# Patient Record
Sex: FEMALE | Race: BLACK OR AFRICAN AMERICAN | NOT HISPANIC OR LATINO | Employment: FULL TIME | URBAN - METROPOLITAN AREA
[De-identification: names, ages, dates, MRNs, and addresses within clinical notes are randomized per-mention and may not be internally consistent; named-entity substitution may affect disease eponyms.]

---

## 2024-06-04 ENCOUNTER — OFFICE VISIT (OUTPATIENT)
Dept: FAMILY MEDICINE CLINIC | Facility: CLINIC | Age: 24
End: 2024-06-04
Payer: COMMERCIAL

## 2024-06-04 VITALS
SYSTOLIC BLOOD PRESSURE: 142 MMHG | BODY MASS INDEX: 31.65 KG/M2 | OXYGEN SATURATION: 95 % | HEART RATE: 105 BPM | RESPIRATION RATE: 16 BRPM | HEIGHT: 65 IN | WEIGHT: 190 LBS | DIASTOLIC BLOOD PRESSURE: 80 MMHG

## 2024-06-04 DIAGNOSIS — Z11.59 NEED FOR HEPATITIS C SCREENING TEST: ICD-10-CM

## 2024-06-04 DIAGNOSIS — Z12.4 CERVICAL CANCER SCREENING: ICD-10-CM

## 2024-06-04 DIAGNOSIS — Z13.220 SCREENING FOR HYPERLIPIDEMIA: ICD-10-CM

## 2024-06-04 DIAGNOSIS — Z13.228 ENCOUNTER FOR SCREENING FOR OTHER METABOLIC DISORDERS: ICD-10-CM

## 2024-06-04 DIAGNOSIS — Z00.00 ANNUAL PHYSICAL EXAM: Primary | ICD-10-CM

## 2024-06-04 DIAGNOSIS — N92.6 IRREGULAR PERIODS: ICD-10-CM

## 2024-06-04 DIAGNOSIS — N94.10 PAIN IN FEMALE GENITALIA ON INTERCOURSE: ICD-10-CM

## 2024-06-04 PROBLEM — E66.9 OBESITY (BMI 30-39.9): Status: ACTIVE | Noted: 2024-06-04

## 2024-06-04 PROCEDURE — 99385 PREV VISIT NEW AGE 18-39: CPT | Performed by: NURSE PRACTITIONER

## 2024-06-04 RX ORDER — FLUTICASONE PROPIONATE 50 MCG
SPRAY, SUSPENSION (ML) NASAL EVERY 24 HOURS
COMMUNITY

## 2024-06-04 RX ORDER — MELOXICAM 15 MG/1
15 TABLET ORAL DAILY
COMMUNITY
Start: 2024-04-03

## 2024-06-04 RX ORDER — CEFDINIR 300 MG/1
CAPSULE ORAL
COMMUNITY
Start: 2024-05-24

## 2024-06-04 RX ORDER — CYCLOBENZAPRINE HCL 10 MG
10 TABLET ORAL
COMMUNITY
Start: 2024-04-03

## 2024-06-04 RX ORDER — CETIRIZINE HYDROCHLORIDE 10 MG/1
1 TABLET ORAL DAILY
COMMUNITY

## 2024-06-04 NOTE — PROGRESS NOTES
Preventive Exam    History of Present Illness: Dede Howard is a 24 y.o. here for check up and review of routine health maintenance. she states she is doing well and has no concerns.    She is new to me and new to the office. She has irregular periods, reports that this has been this way since high school. Was placed on birth control and this made her periods worse.  She reports pain with intercourse and low sex drive. She admits to facial hair growth.     Past medical history, surgical history and family history have been reviewed.     Review of Systems   Constitutional:  Negative for appetite change, chills, fatigue and fever.   HENT:  Negative for congestion, dental problem, mouth sores, sinus pressure and sore throat.         Dental exam is up to date.    Eyes: Negative.  Negative for blurred vision, double vision, photophobia and visual disturbance.        Wears glasses. Eye exam is up to date.    Respiratory:  Negative for cough, chest tightness, shortness of breath and wheezing.    Cardiovascular:  Negative for chest pain, palpitations and leg swelling.   Gastrointestinal:  Negative for abdominal pain, constipation, diarrhea, nausea and vomiting.   Endocrine: Negative.  Negative for cold intolerance and heat intolerance.   Genitourinary: Negative.  Positive for menstrual problem. Negative for breast discharge, breast lump, breast pain, difficulty urinating, dyspareunia, frequency, pelvic pain, pelvic pressure, urgency, vaginal bleeding and vaginal discharge.   Musculoskeletal:  Negative for arthralgias, back pain, joint swelling and myalgias.   Skin: Negative.    Allergic/Immunologic: Negative.  Positive for environmental allergies (Seasonal). Negative for food allergies.   Neurological:  Negative for dizziness, weakness, numbness and headache.   Hematological: Negative.  Does not bruise/bleed easily.   Psychiatric/Behavioral: Negative.  Negative for sleep disturbance, suicidal ideas, negative for  hyperactivity and depressed mood. The patient is nervous/anxious.        PHYSICAL EXAM    Vitals:    06/04/24 1330   BP: 142/80   Pulse: 105   Resp: 16   SpO2: 95%         Body mass index is 31.62 kg/m².   BMI is >= 30 and <35. (Class 1 Obesity). The following options were offered after discussion;: exercise counseling/recommendations and nutrition counseling/recommendations       Physical Exam  Vitals and nursing note reviewed.   Constitutional:       Appearance: She is well-developed.   HENT:      Head: Normocephalic and atraumatic.      Right Ear: Tympanic membrane, ear canal and external ear normal.      Left Ear: Tympanic membrane, ear canal and external ear normal.      Nose: Nose normal.      Mouth/Throat:      Lips: Pink.      Mouth: Mucous membranes are moist.      Tongue: No lesions.      Palate: No mass and lesions.      Pharynx: Oropharynx is clear. Uvula midline.      Tonsils: No tonsillar exudate.   Eyes:      Conjunctiva/sclera: Conjunctivae normal.      Pupils: Pupils are equal, round, and reactive to light.   Neck:      Thyroid: No thyromegaly.   Cardiovascular:      Rate and Rhythm: Normal rate and regular rhythm.      Pulses: Normal pulses.           Dorsalis pedis pulses are 2+ on the right side and 2+ on the left side.        Posterior tibial pulses are 2+ on the right side and 2+ on the left side.      Heart sounds: Normal heart sounds. No murmur heard.  Pulmonary:      Effort: Pulmonary effort is normal.      Breath sounds: Normal breath sounds.   Abdominal:      General: Bowel sounds are normal. There is no distension.      Palpations: Abdomen is soft.      Tenderness: There is no abdominal tenderness.   Musculoskeletal:         General: No deformity. Normal range of motion.      Cervical back: Normal range of motion and neck supple.      Right lower leg: No edema.      Left lower leg: No edema.   Lymphadenopathy:      Head:      Right side of head: No submental, submandibular, tonsillar,  preauricular, posterior auricular or occipital adenopathy.      Left side of head: No submental, submandibular, tonsillar, preauricular, posterior auricular or occipital adenopathy.      Cervical: No cervical adenopathy.      Right cervical: No superficial, deep or posterior cervical adenopathy.     Left cervical: No superficial, deep or posterior cervical adenopathy.      Upper Body:      Right upper body: No supraclavicular adenopathy.      Left upper body: No supraclavicular adenopathy.   Skin:     General: Skin is warm and dry.      Capillary Refill: Capillary refill takes 2 to 3 seconds.   Neurological:      General: No focal deficit present.      Mental Status: She is alert and oriented to person, place, and time.      Cranial Nerves: No cranial nerve deficit.      Sensory: Sensation is intact.      Motor: Motor function is intact.      Coordination: Coordination is intact.      Gait: Gait is intact.   Psychiatric:         Attention and Perception: Attention and perception normal.         Mood and Affect: Mood and affect normal.         Speech: Speech normal.         Behavior: Behavior normal. Behavior is cooperative.         Thought Content: Thought content normal.         Cognition and Memory: Cognition and memory normal.         Judgment: Judgment normal.         Procedures    Diagnoses and all orders for this visit:    1. Annual physical exam (Primary)    2. Pain in female genitalia on intercourse  -     Ambulatory Referral to Obstetrics / Gynecology    3. Irregular periods  -     Ambulatory Referral to Obstetrics / Gynecology    4. Cervical cancer screening  -     Ambulatory Referral to Obstetrics / Gynecology    5. Encounter for screening for other metabolic disorders  -     CBC & Differential  -     Comprehensive Metabolic Panel  -     TSH  -     T3, Free  -     T4, Free    6. Need for hepatitis C screening test  -     Hepatitis C Antibody    7. Screening for hyperlipidemia  -     Lipid Panel With /  Chol / HDL Ratio        Problems Addressed this Visit    None  Visit Diagnoses       Annual physical exam    -  Primary    Pain in female genitalia on intercourse        Relevant Orders    Ambulatory Referral to Obstetrics / Gynecology    Irregular periods        Relevant Orders    Ambulatory Referral to Obstetrics / Gynecology    Cervical cancer screening        Relevant Orders    Ambulatory Referral to Obstetrics / Gynecology    Encounter for screening for other metabolic disorders        Relevant Orders    CBC & Differential    Comprehensive Metabolic Panel    TSH    T3, Free    T4, Free    Need for hepatitis C screening test        Relevant Orders    Hepatitis C Antibody    Screening for hyperlipidemia        Relevant Orders    Lipid Panel With / Chol / HDL Ratio          Diagnoses         Codes Comments    Annual physical exam    -  Primary ICD-10-CM: Z00.00  ICD-9-CM: V70.0     Pain in female genitalia on intercourse     ICD-10-CM: N94.10  ICD-9-CM: 625.0     Irregular periods     ICD-10-CM: N92.6  ICD-9-CM: 626.4     Cervical cancer screening     ICD-10-CM: Z12.4  ICD-9-CM: V76.2     Encounter for screening for other metabolic disorders     ICD-10-CM: Z13.228  ICD-9-CM: V77.99     Need for hepatitis C screening test     ICD-10-CM: Z11.59  ICD-9-CM: V73.89     Screening for hyperlipidemia     ICD-10-CM: Z13.220  ICD-9-CM: V77.91           Referral to OB/GYN - suspect PCOS; vaginismus  CMP  Lipid panel  CBC  Hepatitis C Screening  Thyroid panel    Routine health maintenance reviewed and discussed with Dede Howard.    Preventative counseling regarding healthy diet and exercise.   Pt reports that he wears a seatbelt regularly.     Return in about 1 year (around 6/4/2025) for Annual, Labs.

## 2024-06-05 LAB
ALBUMIN SERPL-MCNC: 4.5 G/DL (ref 3.5–5.2)
ALBUMIN/GLOB SERPL: 1.9 G/DL
ALP SERPL-CCNC: 62 U/L (ref 39–117)
ALT SERPL-CCNC: 8 U/L (ref 1–33)
AST SERPL-CCNC: 11 U/L (ref 1–32)
BASOPHILS # BLD AUTO: 0.06 10*3/MM3 (ref 0–0.2)
BASOPHILS NFR BLD AUTO: 1.3 % (ref 0–1.5)
BILIRUB SERPL-MCNC: 0.4 MG/DL (ref 0–1.2)
BUN SERPL-MCNC: 11 MG/DL (ref 6–20)
BUN/CREAT SERPL: 13.6 (ref 7–25)
CALCIUM SERPL-MCNC: 9.7 MG/DL (ref 8.6–10.5)
CHLORIDE SERPL-SCNC: 101 MMOL/L (ref 98–107)
CHOLEST SERPL-MCNC: 201 MG/DL (ref 0–200)
CHOLEST/HDLC SERPL: 3.65 {RATIO}
CO2 SERPL-SCNC: 24.6 MMOL/L (ref 22–29)
CREAT SERPL-MCNC: 0.81 MG/DL (ref 0.57–1)
EGFRCR SERPLBLD CKD-EPI 2021: 103.5 ML/MIN/1.73
EOSINOPHIL # BLD AUTO: 0.12 10*3/MM3 (ref 0–0.4)
EOSINOPHIL NFR BLD AUTO: 2.5 % (ref 0.3–6.2)
ERYTHROCYTE [DISTWIDTH] IN BLOOD BY AUTOMATED COUNT: 12.1 % (ref 12.3–15.4)
GLOBULIN SER CALC-MCNC: 2.4 GM/DL
GLUCOSE SERPL-MCNC: 97 MG/DL (ref 65–99)
HCT VFR BLD AUTO: 42.6 % (ref 34–46.6)
HCV IGG SERPL QL IA: NON REACTIVE
HDLC SERPL-MCNC: 55 MG/DL (ref 40–60)
HGB BLD-MCNC: 14.2 G/DL (ref 12–15.9)
IMM GRANULOCYTES # BLD AUTO: 0.01 10*3/MM3 (ref 0–0.05)
IMM GRANULOCYTES NFR BLD AUTO: 0.2 % (ref 0–0.5)
LDLC SERPL CALC-MCNC: 135 MG/DL (ref 0–100)
LYMPHOCYTES # BLD AUTO: 1.16 10*3/MM3 (ref 0.7–3.1)
LYMPHOCYTES NFR BLD AUTO: 24.5 % (ref 19.6–45.3)
MCH RBC QN AUTO: 29.3 PG (ref 26.6–33)
MCHC RBC AUTO-ENTMCNC: 33.3 G/DL (ref 31.5–35.7)
MCV RBC AUTO: 88 FL (ref 79–97)
MONOCYTES # BLD AUTO: 0.4 10*3/MM3 (ref 0.1–0.9)
MONOCYTES NFR BLD AUTO: 8.4 % (ref 5–12)
NEUTROPHILS # BLD AUTO: 2.99 10*3/MM3 (ref 1.7–7)
NEUTROPHILS NFR BLD AUTO: 63.1 % (ref 42.7–76)
NRBC BLD AUTO-RTO: 0 /100 WBC (ref 0–0.2)
PLATELET # BLD AUTO: 292 10*3/MM3 (ref 140–450)
POTASSIUM SERPL-SCNC: 3.7 MMOL/L (ref 3.5–5.2)
PROT SERPL-MCNC: 6.9 G/DL (ref 6–8.5)
RBC # BLD AUTO: 4.84 10*6/MM3 (ref 3.77–5.28)
SODIUM SERPL-SCNC: 137 MMOL/L (ref 136–145)
T3FREE SERPL-MCNC: 3.3 PG/ML (ref 2–4.4)
T4 FREE SERPL-MCNC: 1.21 NG/DL (ref 0.92–1.68)
TRIGL SERPL-MCNC: 61 MG/DL (ref 0–150)
TSH SERPL DL<=0.005 MIU/L-ACNC: 1.93 UIU/ML (ref 0.27–4.2)
VLDLC SERPL CALC-MCNC: 11 MG/DL (ref 5–40)
WBC # BLD AUTO: 4.74 10*3/MM3 (ref 3.4–10.8)

## 2024-06-07 ENCOUNTER — PATIENT ROUNDING (BHMG ONLY) (OUTPATIENT)
Dept: FAMILY MEDICINE CLINIC | Facility: CLINIC | Age: 24
End: 2024-06-07
Payer: COMMERCIAL

## 2024-06-07 NOTE — PROGRESS NOTES
A [facility] message has been sent to the patient for PATIENT ROUNDING with [facility] [place].

## 2024-06-20 ENCOUNTER — OFFICE VISIT (OUTPATIENT)
Dept: OBSTETRICS AND GYNECOLOGY | Age: 24
End: 2024-06-20
Payer: COMMERCIAL

## 2024-06-20 VITALS
SYSTOLIC BLOOD PRESSURE: 120 MMHG | DIASTOLIC BLOOD PRESSURE: 80 MMHG | WEIGHT: 190 LBS | HEIGHT: 65 IN | BODY MASS INDEX: 31.65 KG/M2

## 2024-06-20 DIAGNOSIS — E28.2 PCOS (POLYCYSTIC OVARIAN SYNDROME): Primary | ICD-10-CM

## 2024-06-20 DIAGNOSIS — N94.10 FEMALE DYSPAREUNIA: ICD-10-CM

## 2024-06-20 DIAGNOSIS — Z12.4 SCREENING FOR CERVICAL CANCER: ICD-10-CM

## 2024-06-20 DIAGNOSIS — L28.0 LICHEN SIMPLEX CHRONICUS: ICD-10-CM

## 2024-06-20 RX ORDER — NORGESTIMATE AND ETHINYL ESTRADIOL 0.25-0.035
1 KIT ORAL DAILY
Qty: 84 TABLET | Refills: 4 | Status: SHIPPED | OUTPATIENT
Start: 2024-06-20

## 2024-06-20 RX ORDER — CLOBETASOL PROPIONATE 0.5 MG/G
1 OINTMENT TOPICAL NIGHTLY
Qty: 45 G | Refills: 3 | Status: SHIPPED | OUTPATIENT
Start: 2024-06-20

## 2024-06-20 NOTE — PROGRESS NOTES
[facility]   Obstetrics and Gynecology   New Gynecology Visit    2024    Patient: Dede Howard          MR#:[id number]    History of Present Illness    Chief Complaint   Patient presents with    Gynecologic Exam     New gyn: pt complains of irregular periods, pain with intercourse,dry and itchy skin       24 y.o. female  who presents for irregular periods and pain with intercourse. She states that she sometimes skips periods and sometimes her periods last weeks at a time. She does have more than 4 periods a year.   She also has had pain with intercourse both initial penetration and deep penetration since she became sexually active and this is greatly affecting her libido.   In addition, she states that her vulvar skin has always been very dry and itchy despite trying various soaps and moisturizers.           Obstetric History:  OB History          0    Para   0    Term   0       0    AB   0    Living   0         SAB   0    IAB   0    Ectopic   0    Molar   0    Multiple   0    Live Births   0               Menstrual History:     Patient's last menstrual period was 2024 (exact date).                ________________________________________  Patient Active Problem List   Diagnosis    Obesity (BMI 30-39.9)    PCOS (polycystic ovarian syndrome)    Lichen simplex chronicus     Past Medical History:   Diagnosis Date    Allergic     Ovarian cyst     Polycystic ovary syndrome     Scoliosis      History reviewed. No pertinent surgical history.  Social History     Tobacco Use   Smoking Status Never   Smokeless Tobacco Never     Family History   Problem Relation Age of Onset    Thyroid disease Mother     Hypertension Father     No Known Problems Sister     Stroke Maternal Grandmother     Heart disease Paternal Grandfather      Prior to Admission medications    Medication Sig Start Date End Date Taking? Authorizing Provider   cefdinir (OMNICEF) 300 MG capsule  24    "Jonh Naranjo MD   cetirizine (ZyrTEC Allergy) 10 MG tablet Take 1 tablet by mouth Daily.  Patient not taking: Reported on 6/4/2024    Jonh Naranjo MD   cyclobenzaprine (FLEXERIL) 10 MG tablet Take 1 tablet by mouth. 4/3/24   Jonh Naranjo MD   Dexchlorpheniramine-Phenyleph (STAHIST PO) Take  by mouth.    Jonh Naranjo MD   fluticasone (Flonase Allergy Relief) 50 MCG/ACT nasal spray Daily.    Jonh Naranjo MD   meloxicam (MOBIC) 15 MG tablet Take 1 tablet by mouth Daily. 4/3/24   Jonh Naranjo MD     ________________________________________    The following portions of the patient's history were reviewed and updated as appropriate: allergies, current medications, past family history, past medical history, past social history, past surgical history, and problem list.           Objective     /80   Ht 165 cm (64.96\")   Wt 86.2 kg (190 lb)   LMP 05/26/2024 (Exact Date) Comment: irregular  BMI 31.66 kg/m²    BP Readings from Last 3 Encounters:   06/20/24 120/80   06/04/24 142/80      Wt Readings from Last 3 Encounters:   06/20/24 86.2 kg (190 lb)   06/04/24 86.2 kg (190 lb)        BMI: Estimated body mass index is 31.66 kg/m² as calculated from the following:    Height as of this encounter: 165 cm (64.96\").    Weight as of this encounter: 86.2 kg (190 lb).    Physical Exam  Vitals and nursing note reviewed. Exam conducted with a chaperone present.   Constitutional:       Appearance: Normal appearance.   HENT:      Head: Normocephalic and atraumatic.   Pulmonary:      Effort: Pulmonary effort is normal.   Abdominal:      General: Abdomen is flat.      Palpations: Abdomen is soft.   Genitourinary:     Exam position: Lithotomy position.      Vagina: Normal. No vaginal discharge or lesions.      Cervix: Normal. No lesion.      Uterus: Normal. Not tender.       Adnexa: Right adnexa normal and left adnexa normal.        Right: No mass or tenderness.          Left: No " mass or tenderness.        Comments: Dry, scaly, thickened appearing skin likely consistent with lichen simplex chronicus   Skin:     General: Skin is warm and dry.   Neurological:      Mental Status: She is alert and oriented to person, place, and time.   Psychiatric:         Mood and Affect: Mood normal.                 Assessment:  Diagnoses and all orders for this visit:    1. PCOS (polycystic ovarian syndrome) (Primary)  Overview:  - Reviewed diagnostic criteria for PCOS.   - Patient endorses oligomenorrhea and hirsutism.   - Will have patient return for TVUS.   - Labs today.   - Patient would like to try continuous OCPs for menstrual control.       Orders:  -     Prolactin  -     TSH  -     17-Hydroxyprogesterone  -     Testosterone Free Direct  -     DHEA-Sulfate  -     Follicle Stimulating Hormone  -     Estradiol  -     Hemoglobin A1c  -     norgestimate-ethinyl estradiol (ORTHO-CYCLEN) 0.25-35 MG-MCG per tablet; Take 1 tablet by mouth Daily.  Dispense: 84 tablet; Refill: 4    2. Female dyspareunia  -     Ambulatory Referral to Physical Therapy    3. Lichen simplex chronicus  Overview:  - exam today consistent with Lichen simplex chronicus.   - reviewed lifestyle modifications.   - Begin trial of clobetasol for 6 weeks.     Orders:  -     clobetasol (TEMOVATE) 0.05 % ointment; Apply 1 Application topically to the appropriate area as directed Every Night. For 6 weeks.  Dispense: 45 g; Refill: 3    4. Screening for cervical cancer  -     IGP, Apt HPV,rfx 16 / 18,45          Plan:  Return in about 3 months (around 9/20/2024).      Maryan Hawkins MD  6/20/2024 12:01 EDT

## 2024-06-23 LAB
CYTOLOGIST CVX/VAG CYTO: NORMAL
CYTOLOGY CVX/VAG DOC CYTO: NORMAL
CYTOLOGY CVX/VAG DOC THIN PREP: NORMAL
DX ICD CODE: NORMAL
HPV I/H RISK 4 DNA CVX QL PROBE+SIG AMP: NEGATIVE
Lab: NORMAL
OTHER STN SPEC: NORMAL
STAT OF ADQ CVX/VAG CYTO-IMP: NORMAL

## 2024-06-26 LAB
17OHP SERPL-MCNC: 104 NG/DL
DHEA-S SERPL-MCNC: 245 UG/DL (ref 84.8–378)
ESTRADIOL SERPL-MCNC: 63.2 PG/ML
FSH SERPL-ACNC: 5.9 MIU/ML
HBA1C MFR BLD: 5.7 % (ref 4.8–5.6)
PROLACTIN SERPL-MCNC: 14.1 NG/ML (ref 4.8–33.4)
TESTOST FREE SERPL-MCNC: 1.6 PG/ML (ref 0–4.2)
TSH SERPL DL<=0.005 MIU/L-ACNC: 2.9 UIU/ML (ref 0.45–4.5)

## 2024-07-02 DIAGNOSIS — R73.03 PREDIABETES: ICD-10-CM

## 2024-07-02 DIAGNOSIS — E28.2 PCOS (POLYCYSTIC OVARIAN SYNDROME): Primary | ICD-10-CM

## 2024-09-19 ENCOUNTER — OFFICE VISIT (OUTPATIENT)
Dept: OBSTETRICS AND GYNECOLOGY | Age: 24
End: 2024-09-19
Payer: COMMERCIAL

## 2024-09-19 VITALS
SYSTOLIC BLOOD PRESSURE: 120 MMHG | WEIGHT: 189 LBS | HEIGHT: 65 IN | BODY MASS INDEX: 31.49 KG/M2 | DIASTOLIC BLOOD PRESSURE: 80 MMHG

## 2024-09-19 DIAGNOSIS — E28.2 PCOS (POLYCYSTIC OVARIAN SYNDROME): Primary | ICD-10-CM

## 2024-09-19 RX ORDER — SPIRONOLACTONE 50 MG/1
50 TABLET, FILM COATED ORAL DAILY
Qty: 30 TABLET | Refills: 0 | Status: SHIPPED | OUTPATIENT
Start: 2024-09-19

## 2024-09-20 LAB
ALBUMIN SERPL-MCNC: 4.4 G/DL (ref 3.5–5.2)
ALBUMIN/GLOB SERPL: 1.7 G/DL
ALP SERPL-CCNC: 66 U/L (ref 39–117)
ALT SERPL-CCNC: 11 U/L (ref 1–33)
AST SERPL-CCNC: 14 U/L (ref 1–32)
BILIRUB SERPL-MCNC: 0.3 MG/DL (ref 0–1.2)
BUN SERPL-MCNC: 15 MG/DL (ref 6–20)
BUN/CREAT SERPL: 18.3 (ref 7–25)
CALCIUM SERPL-MCNC: 9.3 MG/DL (ref 8.6–10.5)
CHLORIDE SERPL-SCNC: 101 MMOL/L (ref 98–107)
CO2 SERPL-SCNC: 27.3 MMOL/L (ref 22–29)
CREAT SERPL-MCNC: 0.82 MG/DL (ref 0.57–1)
EGFRCR SERPLBLD CKD-EPI 2021: 101.9 ML/MIN/1.73
GLOBULIN SER CALC-MCNC: 2.6 GM/DL
GLUCOSE SERPL-MCNC: 95 MG/DL (ref 65–99)
POTASSIUM SERPL-SCNC: 4 MMOL/L (ref 3.5–5.2)
PROT SERPL-MCNC: 7 G/DL (ref 6–8.5)
SODIUM SERPL-SCNC: 138 MMOL/L (ref 136–145)

## 2025-04-02 ENCOUNTER — OFFICE VISIT (OUTPATIENT)
Dept: OBSTETRICS AND GYNECOLOGY | Age: 25
End: 2025-04-02
Payer: COMMERCIAL

## 2025-04-02 VITALS
DIASTOLIC BLOOD PRESSURE: 94 MMHG | WEIGHT: 195.4 LBS | SYSTOLIC BLOOD PRESSURE: 132 MMHG | HEIGHT: 65 IN | BODY MASS INDEX: 32.55 KG/M2

## 2025-04-02 DIAGNOSIS — E28.2 PCOS (POLYCYSTIC OVARIAN SYNDROME): Primary | ICD-10-CM

## 2025-04-02 NOTE — PROGRESS NOTES
[facility]   Obstetrics and Gynecology     2025      Patient:  Dede Howard   MR#:[id number]    Office note    Chief Complaint   Patient presents with    Follow-up     CC: gyn f/u        Subjective     History of Present Illness  25 y.o. female  presenting for f/u for menstrual suppression. She has not been taking her OCPs or her metformin, but has been having relatively normal periods. She also states she still has some pelvic pain, but has not committed to performing pelvic floor exercises as prescribed by PT at home.             Patient Active Problem List   Diagnosis    Obesity (BMI 30-39.9)    PCOS (polycystic ovarian syndrome)    Lichen simplex chronicus       Past Medical History:   Diagnosis Date    Allergic     Ovarian cyst     Polycystic ovary syndrome     Scoliosis      History reviewed. No pertinent surgical history.  Obstetric History:  OB History          0    Para   0    Term   0       0    AB   0    Living   0         SAB   0    IAB   0    Ectopic   0    Molar   0    Multiple   0    Live Births   0               Menstrual History:     Patient's last menstrual period was 2025 (exact date).       The patient has never been pregnant.  Family History   Problem Relation Age of Onset    Thyroid disease Mother     Hypertension Father     No Known Problems Sister     Stroke Maternal Grandmother     Heart disease Paternal Grandfather      Social History     Tobacco Use    Smoking status: Never    Smokeless tobacco: Never   Vaping Use    Vaping status: Never Used   Substance Use Topics    Alcohol use: Never    Drug use: Never     Penicillins    Current Outpatient Medications:     cefdinir (OMNICEF) 300 MG capsule, , Disp: , Rfl:     cetirizine (ZyrTEC Allergy) 10 MG tablet, Take 1 tablet by mouth Daily. (Patient not taking: Reported on 2025), Disp: , Rfl:     clobetasol (TEMOVATE) 0.05 % ointment, Apply 1 Application topically to the appropriate area  "as directed Every Night. For 6 weeks. (Patient not taking: Reported on 4/2/2025), Disp: 45 g, Rfl: 3    cyclobenzaprine (FLEXERIL) 10 MG tablet, Take 1 tablet by mouth. (Patient not taking: Reported on 4/2/2025), Disp: , Rfl:     Dexchlorpheniramine-Phenyleph (STAHIST PO), Take  by mouth. (Patient not taking: Reported on 4/2/2025), Disp: , Rfl:     fluticasone (Flonase Allergy Relief) 50 MCG/ACT nasal spray, Daily. (Patient not taking: Reported on 4/2/2025), Disp: , Rfl:     meloxicam (MOBIC) 15 MG tablet, Take 1 tablet by mouth Daily. (Patient not taking: Reported on 4/2/2025), Disp: , Rfl:     metFORMIN (GLUCOPHAGE) 500 MG tablet, Take 1 tablet by mouth 2 (Two) Times a Day With Meals. (Patient not taking: Reported on 4/2/2025), Disp: 60 tablet, Rfl: 3    norgestimate-ethinyl estradiol (ORTHO-CYCLEN) 0.25-35 MG-MCG per tablet, Take 1 tablet by mouth Daily. (Patient not taking: Reported on 4/2/2025), Disp: 84 tablet, Rfl: 4    spironolactone (Aldactone) 50 MG tablet, Take 1 tablet by mouth Daily. (Patient not taking: Reported on 4/2/2025), Disp: 30 tablet, Rfl: 0      Review of Systems   All other systems reviewed and are negative.      BP Readings from Last 3 Encounters:   04/02/25 132/94   09/19/24 120/80   06/20/24 120/80      Wt Readings from Last 3 Encounters:   04/02/25 88.6 kg (195 lb 6.4 oz)   09/19/24 85.7 kg (189 lb)   06/20/24 86.2 kg (190 lb)      BMI: Estimated body mass index is 32.56 kg/m² as calculated from the following:    Height as of this encounter: 165 cm (64.96\").    Weight as of this encounter: 88.6 kg (195 lb 6.4 oz). BSA: Estimated body surface area is 1.96 meters squared as calculated from the following:    Height as of this encounter: 165 cm (64.96\").    Weight as of this encounter: 88.6 kg (195 lb 6.4 oz).    Objective   Physical Exam  Vitals and nursing note reviewed.   Constitutional:       Appearance: Normal appearance.   HENT:      Head: Normocephalic and atraumatic.   Pulmonary:      " Effort: Pulmonary effort is normal.   Neurological:      Mental Status: She is alert and oriented to person, place, and time.   Psychiatric:         Mood and Affect: Mood normal.         Assessment & Plan   25 y.o. female  presenting for f/u for menstrual suppression.     Diagnoses and all orders for this visit:    1. PCOS (polycystic ovarian syndrome) (Primary)  Overview:  24  - Reviewed diagnostic criteria for PCOS.   - Patient endorses oligomenorrhea and hirsutism.   - Will have patient return for TVUS.   - Labs today.   - Patient would like to try continuous OCPs for menstrual control.     24  -Has not started taking birth control pills, but states that she will start as she would like to have fewer or no periods.  We discussed continuous cycling with OCPs.  -We will also start spironolactone today CMP ordered for LFTs.  -TVUS reveals polycystic appearing ovaries but otherwise normal.    25  -Patient has never started OCPs.  -Has been having relatively regular periods.  -We discussed that we do not need to treat her periods if they are not bothering her.  She was started on metformin by her PCP but discontinued after a day due to nausea.  I did encourage her to try to continue this medication at least.  -We will continue to monitor her cycles off of OCPs for now and I will see her again this summer for her annual.          Return in about 3 months (around 2025).    Maryan Hawkins MD   2025 15:31 EDT

## 2025-05-21 ENCOUNTER — OFFICE VISIT (OUTPATIENT)
Dept: FAMILY MEDICINE CLINIC | Facility: CLINIC | Age: 25
End: 2025-05-21
Payer: COMMERCIAL

## 2025-05-21 VITALS
BODY MASS INDEX: 32.82 KG/M2 | RESPIRATION RATE: 18 BRPM | WEIGHT: 197 LBS | DIASTOLIC BLOOD PRESSURE: 98 MMHG | OXYGEN SATURATION: 100 % | HEART RATE: 106 BPM | SYSTOLIC BLOOD PRESSURE: 142 MMHG | HEIGHT: 65 IN

## 2025-05-21 DIAGNOSIS — M54.50 CHRONIC LOW BACK PAIN WITHOUT SCIATICA, UNSPECIFIED BACK PAIN LATERALITY: Primary | ICD-10-CM

## 2025-05-21 DIAGNOSIS — G89.29 CHRONIC LOW BACK PAIN WITHOUT SCIATICA, UNSPECIFIED BACK PAIN LATERALITY: Primary | ICD-10-CM

## 2025-05-21 PROCEDURE — 99213 OFFICE O/P EST LOW 20 MIN: CPT | Performed by: NURSE PRACTITIONER

## 2025-05-21 RX ORDER — NAPROXEN 500 MG/1
TABLET ORAL
COMMUNITY
Start: 2025-05-19

## 2025-05-21 RX ORDER — METHOCARBAMOL 500 MG/1
TABLET, FILM COATED ORAL
COMMUNITY
Start: 2025-05-19

## 2025-05-21 NOTE — PROGRESS NOTES
Subjective   Dede Howard is a 25 y.o. female.     Chief Complaint   Patient presents with    Back Pain        History of Present Illness     History of Present Illness  The patient presents for back pain.    She reports history of back pain, which was exacerbated on Easter Sunday when she experienced a sharp, tailbone-like pain while dressing. This incident rendered her immobile for the remainder of the day, with the pain persisting for an additional 2 to 3 days. The pain was particularly intense during movements such as bending or sitting up, and it took her approximately an hour to rise from bed on the first night. Due to the unavailability of urgent care facilities on that day, she sought medical attention at the ER where an x-ray revealed abnormalities in her disc. She was subsequently referred to a spine specialist who recommended further imaging studies including a CT scan and MRI.     She initiated physical therapy yesterday and is currently seeking to return to work. She has been off work since 05/09/2025. She has been provided with exercises by her physical therapist. She has a past history of back pain, but not of this severity.     The following portions of the patient's history were reviewed and updated as appropriate: allergies, current medications, past family history, past medical history, past social history, past surgical history and problem list.    Review of Systems   Constitutional:  Negative for chills, fatigue, fever and unexpected weight loss.   Respiratory:  Negative for cough, choking, chest tightness, shortness of breath, wheezing and stridor.    Cardiovascular:  Negative for chest pain, palpitations and leg swelling.   Gastrointestinal:  Negative for abdominal pain.   Genitourinary:  Negative for dysuria, pelvic pain, urinary incontinence, vaginal bleeding and vaginal discharge.   Musculoskeletal:  Positive for back pain.   Neurological:  Negative for dizziness, weakness, numbness and  headache.   Hematological: Negative.    Psychiatric/Behavioral: Negative.  Negative for sleep disturbance.        Objective   Physical Exam  Vitals and nursing note reviewed.   Constitutional:       Appearance: Normal appearance. She is well-developed.   HENT:      Head: Normocephalic and atraumatic.   Eyes:      Conjunctiva/sclera: Conjunctivae normal.      Pupils: Pupils are equal, round, and reactive to light.   Cardiovascular:      Rate and Rhythm: Normal rate and regular rhythm.      Heart sounds: Normal heart sounds. No murmur heard.  Pulmonary:      Effort: Pulmonary effort is normal.      Breath sounds: Normal breath sounds.   Neurological:      Mental Status: She is alert and oriented to person, place, and time.   Psychiatric:         Behavior: Behavior normal.         Thought Content: Thought content normal.         Judgment: Judgment normal.         Vitals:    05/21/25 1006   BP: 142/98   Pulse: 106   Resp: 18   SpO2: 100%     Body mass index is 32.82 kg/m².      Procedures    Assessment & Plan   Problems Addressed this Visit    None  Visit Diagnoses         Chronic low back pain without sciatica, unspecified back pain laterality    -  Primary          Diagnoses         Codes Comments      Chronic low back pain without sciatica, unspecified back pain laterality    -  Primary ICD-10-CM: M54.50, G89.29  ICD-9-CM: 724.2, 338.29             Assessment & Plan  1. Back pain.  - Reports a history of back pain, significantly worsened on Easter while bending over, with sharp pain localized around the tailbone, making it difficult to walk or perform basic movements.  - An ER visit resulted in an x-ray showing issues with her disks, leading to referrals for a CT scan and MRI. 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  - Discussed the need for all relevant records to complete disability paperwork. The neurosurgeon's note indicates she  can return to work in a month.  - Advised to continue with physical therapy as recommended by her spine specialist. Instructed to have [facility] fax their plan of care to this office to assist in completing her disability paperwork.          Return if symptoms worsen or fail to improve.    Patient or patient representative verbalized consent for the use of Ambient Listening during the visit with  SKYLER Moon for chart documentation. 5/21/2025  10:50 EDT

## 2025-07-09 ENCOUNTER — OFFICE VISIT (OUTPATIENT)
Dept: OBSTETRICS AND GYNECOLOGY | Age: 25
End: 2025-07-09
Payer: COMMERCIAL

## 2025-07-09 VITALS
HEIGHT: 65 IN | BODY MASS INDEX: 32.29 KG/M2 | WEIGHT: 193.8 LBS | SYSTOLIC BLOOD PRESSURE: 110 MMHG | DIASTOLIC BLOOD PRESSURE: 78 MMHG

## 2025-07-09 DIAGNOSIS — Z01.419 ENCOUNTER FOR ANNUAL ROUTINE GYNECOLOGICAL EXAMINATION: Primary | ICD-10-CM

## 2025-07-09 DIAGNOSIS — Z23 NEED FOR HPV VACCINE: ICD-10-CM

## 2025-07-09 NOTE — PROGRESS NOTES
[facility]   Obstetrics and Gynecology     2025    Patient: Dede Howard          MR#:[id number]    History of Present Illness    Chief Complaint   Patient presents with    Gynecologic Exam     CC: annual. Last pap 24(-) hpv (-).          25 y.o. female  who presents for annual exam. No new concerns today. Her bleeding has been relatively normal recently.     Relevant data reviewed:    Patient's last menstrual period was 2025 (exact date).  Obstetric History:  OB History          0    Para   0    Term   0       0    AB   0    Living   0         SAB   0    IAB   0    Ectopic   0    Molar   0    Multiple   0    Live Births   0               Menstrual History:     Patient's last menstrual period was 2025 (exact date).       Social History     Substance and Sexual Activity   Sexual Activity Not Currently    Partners: Male    Birth control/protection: Condom     ______________________________________  Patient Active Problem List   Diagnosis    Obesity (BMI 30-39.9)    PCOS (polycystic ovarian syndrome)    Lichen simplex chronicus     Past Medical History:   Diagnosis Date    Allergic     Depression     Ovarian cyst     Polycystic ovary syndrome     Scoliosis      History reviewed. No pertinent surgical history.  Social History     Tobacco Use   Smoking Status Never   Smokeless Tobacco Never     Family History   Problem Relation Age of Onset    Thyroid disease Mother     Hypertension Father     No Known Problems Sister     Stroke Maternal Grandmother     Cancer Paternal Grandmother         Breast cancer    Heart disease Paternal Grandfather      Prior to Admission medications    Medication Sig Start Date End Date Taking? Authorizing Provider   methocarbamol (ROBAXIN) 500 MG tablet  25  Yes ProviderJonh MD   cetirizine (ZyrTEC Allergy) 10 MG tablet Take 1 tablet by mouth Daily.  Patient not taking: Reported on 2025    Provider, Historical,  MD   fluticasone (Flonase Allergy Relief) 50 MCG/ACT nasal spray Daily.  Patient not taking: Reported on 7/9/2025    Provider, MD Jonh   naproxen (NAPROSYN) 500 MG tablet  5/19/25   Provider, Jonh, MD     _______________________________________    Current contraception: none  History of abnormal Pap smear: no  Family history of uterine or ovarian cancer: no  Family History of colon cancer/colon polyps: no  History of abnormal mammogram: not yet indicated  History of abnormal lipids: no    The following portions of the patient's history were reviewed and updated as appropriate: allergies, current medications, past family history, past medical history, past social history, past surgical history, and problem list.        Pertinent items are noted in HPI.       Objective   Physical Exam  Vitals and nursing note reviewed. Exam conducted with a chaperone present.   Constitutional:       Appearance: Normal appearance.   HENT:      Head: Normocephalic and atraumatic.   Pulmonary:      Effort: Pulmonary effort is normal.   Chest:   Breasts:     Right: Normal.      Left: Normal.   Abdominal:      General: Abdomen is flat.      Palpations: Abdomen is soft.   Genitourinary:     Exam position: Lithotomy position.      Labia:         Right: No rash or lesion.         Left: No rash or lesion.       Vagina: Normal. No vaginal discharge or lesions.      Cervix: Normal. No lesion.      Uterus: Normal. Not tender.       Adnexa: Right adnexa normal and left adnexa normal.        Right: No mass or tenderness.          Left: No mass or tenderness.     Lymphadenopathy:      Upper Body:      Right upper body: No supraclavicular, axillary or pectoral adenopathy.      Left upper body: No supraclavicular, axillary or pectoral adenopathy.   Skin:     General: Skin is warm and dry.   Neurological:      Mental Status: She is alert and oriented to person, place, and time.   Psychiatric:         Mood and Affect: Mood normal.  "        /78   Ht 165 cm (64.96\")   Wt 87.9 kg (193 lb 12.8 oz)   LMP 2025 (Exact Date)   BMI 32.29 kg/m²    BP Readings from Last 3 Encounters:   25 110/78   25 142/98   25 132/94      Wt Readings from Last 3 Encounters:   25 87.9 kg (193 lb 12.8 oz)   25 89.4 kg (197 lb)   25 88.6 kg (195 lb 6.4 oz)        BMI: Estimated body mass index is 32.29 kg/m² as calculated from the following:    Height as of this encounter: 165 cm (64.96\").    Weight as of this encounter: 87.9 kg (193 lb 12.8 oz).    As part of wellness and prevention, the following topics were discussed with the patient:  Encouraged self breast exam  Sexual transmitted disease prevention   Contraception discussed.   Dental health discussed  Vaccinations/immunizations addressed.           Problem List   Meds  History  Prep for Surg   Imagin}    Assessment:  25 y.o. female  who presents for annual exam.  Diagnoses and all orders for this visit:    1. Encounter for annual routine gynecological examination (Primary)    2. Need for HPV vaccine  -     HPV Vaccine (HPV9)    - Pap normal last year   - Mammo/Colonoscopy not yet indicated   - Vaccine recs reviewed  - STI screening declined     Plan:  No follow-ups on file.    Maryan Hawkins MD  2025 09:20 EDT  "